# Patient Record
(demographics unavailable — no encounter records)

---

## 2024-11-04 NOTE — HISTORY OF PRESENT ILLNESS
[FreeTextEntry1] : doing well gge: normal no issues with ostomy denies pain ready for ileostomy closure

## 2024-11-19 NOTE — PHYSICAL EXAM
[No Rash or Lesion] : No rash or lesion [Alert] : alert [Oriented to Person] : oriented to person [Oriented to Place] : oriented to place [Oriented to Time] : oriented to time [Calm] : calm [de-identified] : +stoma [de-identified] : WDWN male, NAD [de-identified] : NC/AT [facility] [de-identified] : No C/C/E noted

## 2024-11-19 NOTE — ASSESSMENT
[FreeTextEntry1] : 31 yo male with hx of bowel perforation - with crohn's disease - will see Dr. Castro for a follow up. Follow up with Dr. Nicole in 1 month Will need GGE  Follow up with Arthur as needed discussed dehydration signs/symptoms   11/19/2024 for ileostomy closure with Dr. Nicole PST today discussed pre/post operative period given pre-surgery drink  Risks, Benefits, Plan and Alternatives discussed and they consent.

## 2024-11-19 NOTE — DATA REVIEWED
[FreeTextEntry1] : 10/31/2024 IMPRESSION: Status post end-to-side ilio transverse colon anastomosis. The patient is status post right hemicolectomy

## 2024-11-19 NOTE — HISTORY OF PRESENT ILLNESS
[FreeTextEntry1] : 31 yo male here for a post op appointment. 8/16/2024 PROCEDURE:  Laparoscopic ileocolic resection, retroperitoneal abscess drainage, loop ileostomy.  He finished his course of antibiotics. No pain currently. He is not on any pain medication. His appetite is good. He is eating and his baseline weight - is about where he is. He does not feel dehydrated. He is drinking a lot of water. He has been home resting. He does get tired easily still. He is working remotely, does HR. He does not currently have a GI - will see Dr. Castro next week.  11/19/2024 31 yo male seen today with his partner. He is feeling well and no complaints. He is looking forward to having his ostomy closed. No chest pain, fever, cough or shortness of breath. No nausea/vomiting.

## 2024-12-16 NOTE — PHYSICAL EXAM
[No HSM] : no hepatosplenomegaly [No Rash or Lesion] : No rash or lesion [Alert] : alert [Oriented to Person] : oriented to person [Oriented to Place] : oriented to place [Oriented to Time] : oriented to time [Calm] : calm [Abdomen Masses] : No abdominal masses [Abdomen Tenderness] : ~T No ~M abdominal tenderness [Wheezing] : no wheezing was heard [de-identified] : ostomy closure site is healing well [de-identified] : well-appearing, awake, alert and oriented x3

## 2024-12-16 NOTE — ASSESSMENT
[FreeTextEntry1] : Mr. Aranda is a 30-year-old gentleman with Crohn's disease.  He is 4 weeks status post ileostomy closure and is doing well.  We had a discussion about the next steps of his treatment.  Surgically he is doing well and will follow-up as needed.  However he is going to need medical therapy for prophylaxis of Crohn's disease.  He is going to make a appointment with Dr. Castro  to discuss further steps.  I spent 30 minutes with the patient discussing the plan of care.  All questions were answered.

## 2024-12-16 NOTE — HISTORY OF PRESENT ILLNESS
[FreeTextEntry1] : Mr. Aranda presented for follow-up.  He has a history of Crohn's disease status post ileocolic resection.  He most recently underwent a loop ileostomy reversal.  He is doing well.  Bowel movements are regular he goes once or twice a day.  No blood in the stool.  He is tolerating a low fiber diet and introducing fiber slowly in his diet.  Incisions are healing well.  There is a pinhole opening on the ostomy reversal site with minimal discharge.  He is going to make an appointment with Dr. Castro to start medical treatment.

## 2025-01-22 NOTE — ASSESSMENT
[FreeTextEntry1] : 30F, hx of CD s/p ileocolic resection and reversal, here for f/u visit.   # Crohns Disease  - dx at age 19  - prev on mesalamine, Humira  - s/p ileocolic resection and now reversal  - Discussion with patient re: biologics. He elects to proceed with Nickoi. R/B discussed in detail - check quant gold today  - check vit d level today  - schedule egd/colon/MR in next 3-6 mos  - preventative care at next visit   RTC 3 mos

## 2025-01-22 NOTE — END OF VISIT
[FreeTextEntry3] : Seen with KEYA Welch.  He is doing well after ileostomy reversal.  Will plan for skyrizi. R/B discussed in detail  Plan for egd/colon/MRI in next 3-6 mos.  RTC in 3 mos    [Time Spent: ___ minutes] : I have spent [unfilled] minutes of time on the encounter which excludes teaching and separately reported services.

## 2025-01-22 NOTE — HISTORY OF PRESENT ILLNESS
[FreeTextEntry1] : Here for f/u visit.  s/p loop ileostomy reversal Patient is feeling fine. Having one normal bowel movement . No diarrhea or loose stools . no vision changes . rash or weight loss .   Feels overall well   ---------------------------------------------------------------------------- IBD History  Dx w Crohns Disease at age 19.  At that time patient was having diarrhea, hematochezia, cramping abdominal pain.  Initially on mesalamine and then on Humira for several years.  Stopped medication and had been off meds x 5 yrs.  Admitted in 2024 with abd pain, diarrhea, fevers.  s/p ileocolic resection, abscess drainage and loop ileostomy in August 2024  Now s/p ileostomy reversal at end of 2024.

## 2025-02-05 NOTE — HISTORY OF PRESENT ILLNESS
[Denies] : Denies [No] : No [N/A] : N/A [Yes] : Yes [TB] : Tuberculosis screening [Total Hep B core AB] : total Hepatitis B Core antibody [Right upper extremity] : Right upper extremity [24g] : 24g [Start Time: ___] : Medication Start Time: [unfilled] [End Time: ___] : Medication End Time: [unfilled] [Medication Name: ___] : Medication Name: [unfilled] [Total Amount Administered: ___] : Total Amount Administered: [unfilled] [IV discontinued. Intact. No signs or symptoms of IV complications noted. Time: ___] : IV discontinued. Intact. No signs or symptoms of IV complications noted. Time: [unfilled] [Patient  instructed to seek medical attention with signs and symptoms of adverse effects] : Patient  instructed to seek medical attention with signs and symptoms of adverse effects [Patient left unit in no acute distress] : Patient left unit in no acute distress [Medications administered as ordered and tolerated well.] : Medications administered as ordered and tolerated well. [de-identified] : [id number] [de-identified] : Pt presented for first skyrizi infusion. Endorses 1 formed BM per day, no N/V/weight loss. Pt had his ileostomy reversed in Nov 2024. Pt educated to inform RN if adverse sx present during infusion, or to call prescriber if sx present after dc. Pt verbalized understanding. NP Martha Penny present in infusion suite to discuss plan of care and f/u appointments. Pt monitored 30 mins post infusion.

## 2025-02-05 NOTE — DISCUSSION/SUMMARY
[FreeTextEntry1] : Dx w Crohns Disease at age 19. Initially was on mesalamine and then on Humira for several years. Stopped medication and had been off meds x 7 yrs. Admitted in Aug 2024 with abd pain, diarrhea, fevers. s/p ileocolic resection, abscess drainage and loop ileostomy in August 2024 Now s/p ileostomy reversal Nov 2024.   He presents for his first Skyrizi infusion today accompanied by his . He endorses feeling well- Has been eating better- weight stable  Endorses having 1 formed Bm a day  Denies joint pain/rash  Skyrizi complete package completed and faxed over  Premedicated with Tylenol and Zyrtec with infusion  Tolerated infusion well with no adverse effects

## 2025-03-07 NOTE — DISCUSSION/SUMMARY
[FreeTextEntry1] : Pt presents for the second Skyrizi infusion- accompanied by his spouse-  He states he is been feeling same -  Endorses having 1 BM a day  OBI teaching done with pt and partner-  Advised pt to call [facility] to schedule the delivery of medication-  Tolerated infusion well with no adverse effects.

## 2025-03-07 NOTE — HISTORY OF PRESENT ILLNESS
[Denies] : Denies [No] : No [Yes] : Yes [TB] : Tuberculosis screening [Total Hep B core AB] : total Hepatitis B Core antibody [Right upper extremity] : Right upper extremity [24g] : 24g [Start Time: ___] : Medication Start Time: [unfilled] [End Time: ___] : Medication End Time: [unfilled] [Medication Name: ___] : Medication Name: [unfilled] [Total Amount Administered: ___] : Total Amount Administered: [unfilled] [IV discontinued. Intact. No signs or symptoms of IV complications noted. Time: ___] : IV discontinued. Intact. No signs or symptoms of IV complications noted. Time: [unfilled] [Patient  instructed to seek medical attention with signs and symptoms of adverse effects] : Patient  instructed to seek medical attention with signs and symptoms of adverse effects [Patient left unit in no acute distress] : Patient left unit in no acute distress [Medications administered as ordered and tolerated well.] : Medications administered as ordered and tolerated well. [de-identified] : 3975 [de-identified] : Pt presented for infusion, No adverse gi sx noted. Pt educated to inform RN if adverse sx present during infusion, or to call prescriber if sx present after infusion. pt verbalized understanding. DORINA castro present in infusion suite to discuss POC and f/u infusion.

## 2025-04-03 NOTE — ASSESSMENT
[FreeTextEntry1] : 30F, hx of CD s/p ileocolic resection and reversal, here for f/u visit.   # Crohns Disease  - dx at age 19  - prev on mesalamine, Humira  - s/p ileocolic resection and now reversal  Currently on Skyrizi  Finish 3rd loading dose and transition to OBI  OBI teaching done and demonstrated to pt  Transition care from Dr Castro to Dr Rousseau-  - schedule egd/colon/MR in next 3-6 mos  - preventative care at next visit   RTC 3 mos

## 2025-04-03 NOTE — HISTORY OF PRESENT ILLNESS
[FreeTextEntry1] : Here for 3rd Skyrizi loading dose infusion  s/p loop ileostomy reversal Patient is feeling fine. Having one normal bowel movement . No diarrhea or loose stools . no vision changes . rash or weight loss .   Feels overall well   ---------------------------------------------------------------------------- IBD History  Dx w Crohns Disease at age 19.  At that time patient was having diarrhea, hematochezia, cramping abdominal pain.  Initially on mesalamine and then on Humira for several years.  Stopped medication and had been off meds x 5 yrs.  Admitted in 2024 with abd pain, diarrhea, fevers.  s/p ileocolic resection, abscess drainage and loop ileostomy in August 2024  Now s/p ileostomy reversal at end of 2024.

## 2025-04-23 NOTE — PLAN
[TextEntry] : Colon to eval reponse to Kaushal The procedure(s) was (were) discussed in detail with the patient, including indications, alternatives and limitations.  The risks and benefits were reviewed.  If applicable, the prep directions were reviewed as well, else the patient was told to fast on the day of the procedure.  Shingrix f/u OV TBD based on colon results

## 2025-04-23 NOTE — ASSESSMENT
[FreeTextEntry1] : 30 M h/o CD s/p IC resection for abscess off meds, now on skyrizi, here to continue care (Dr Castro pt). This was pt I transferred from [facility] to [facility] for SBO mgmt.

## 2025-04-23 NOTE — HISTORY OF PRESENT ILLNESS
[FreeTextEntry1] : 30 M h/o CD s/p TI resection  after SBO here for f/u.   Plan at last appointment 2025 Mamun: - Discussion with patient re: biologics. He elects to proceed with Skyrizi. R/B discussed in detail - check quant gold today - check vit d level today - schedule egd/colon/MR in next 3-6 mos - preventative care at next visit   Current status:no complaints   BM/D: 1-2 BM/night: 0 Urgency:no Incontinence:no Blood: no Mucus: no Weight loss:20-30 lb weight gain because of working from home Fatigue: no   Joint aches: no Skin issues: no Eye issues: no       IBD Hx: Initial dx:18 yo  CD: fistulizing, + abscesses, strictures Presentation:diarrhea, hematochezia, cramping abdominal pain. Meds failed:ASA, Humira (approx 3 y, early 20s, asxatic so doc told him to stop)  Current meds:Skyrizi 3/5/25- (3 IV) (2-3 days after fatigue) Next due: May 2025 Last date administered: Last level checked: Last colonoscopy:n/a Last imagin25 CT: IMPRESSION: There is wall thickening of the proximal to mid ascending colon with extensive pericolonic inflammatory/phlegmonous changes suggesting infectious versus inflammatory colitis.  A 3.2 cm adjacent air-fluid collection in the right paracolic gutter suggesting contained perforation/abscess. The appendix is not seen within this setting, although acute appendicitis may also be present. Associated mild reactive free fluid. Peritonitis noted. There is wall thickening of the terminal ileum, which may be reactive, although infectious versus inflammatory terminal ileitis may also be present. No free air. No bowel obstruction. Surgical consult is recommended.   Surgery:lop ileostomy reversal; ileocolic resection  (abscess drainage)     TB:-ve  HBV:-ve 2024 Shingrix:  PNA:   Labs: 2025 labs: Hb 14.7 MCV 80.4 nl CMP

## 2025-06-11 NOTE — HISTORY OF PRESENT ILLNESS
[FreeTextEntry1] : 30 M h/o CD s/p IC resection for abscess off meds, now on skyrizi, here to continue care (Dr Castro pt). This was pt I transferred from [facility] to [facility] for SBO mgmt.

## 2025-06-11 NOTE — ASSESSMENT
[FreeTextEntry1] : 30 M h/o CD s/p IC resection for abscess off meds, now on skyrizi, here to continue care (Dr Castro pt). This was pt I transferred from [facility] to [facility] for SBO mgmt.  The patient is medically optimized for procedure(s). All questions have been answered. The risks and benefits of the procedure have been discussed and the patient signed consent for the procedure. Preliminary results will be discussed when the patient wakes up from anesthesia, but definitive results will be discussed after the pathology report is issued in 5 business days.

## 2025-06-11 NOTE — ASSESSMENT
[FreeTextEntry1] : 30 M h/o CD s/p IC resection for abscess off meds, now on skyrizi, here to continue care (Dr Castro pt). This was pt I transferred from [facility] to [facility] for SBO mgmt.  The patient is medically optimized for procedure(s). All questions have been answered. The risks and benefits of the procedure have been discussed and the patient signed consent for the procedure. Preliminary results will be discussed when the patient wakes up from anesthesia, but definitive results will be discussed after the pathology report is issued in 5 business days.

## 2025-06-11 NOTE — HISTORY OF PRESENT ILLNESS
[FreeTextEntry1] : 30 M h/o CD s/p IC resection for abscess off meds, now on skyrizi, here to continue care (Dr Castro pt). This was pt I transferred from [facility] to [facility] for SBO mgmt.

## 2025-06-11 NOTE — ASSESSMENT
[FreeTextEntry1] : 30 M h/o CD s/p IC resection for abscess off meds, now on skyrizi, here to continue care (Dr Castro pt). This was pt I transferred from [facility] to [facility] for SBO mgmt.  The patient is medically optimized for procedure(s). All questions have been answered. The risks and benefits of the procedure have been discussed and the patient signed consent for the procedure. Preliminary results will be discussed when the patient wakes up from anesthesia, but definitive results will be discussed after the pathology report is issued in 5 business days.

## 2025-06-11 NOTE — HISTORY OF PRESENT ILLNESS
[FreeTextEntry1] : 30 M h/o CD s/p IC resection for abscess off meds, now on skyrizi, here to continue care (Dr Castro pt). This was pt I transferred from [facility] to [facility] for SBO mgmt.